# Patient Record
Sex: FEMALE | Race: BLACK OR AFRICAN AMERICAN | NOT HISPANIC OR LATINO | RURAL
[De-identification: names, ages, dates, MRNs, and addresses within clinical notes are randomized per-mention and may not be internally consistent; named-entity substitution may affect disease eponyms.]

---
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
## 2024-01-24 ENCOUNTER — OFFICE VISIT (OUTPATIENT)
Dept: FAMILY MEDICINE | Facility: CLINIC | Age: 19
End: 2024-01-24
Payer: MEDICAID

## 2024-01-24 VITALS
DIASTOLIC BLOOD PRESSURE: 105 MMHG | HEART RATE: 85 BPM | TEMPERATURE: 98 F | WEIGHT: 210 LBS | SYSTOLIC BLOOD PRESSURE: 139 MMHG | OXYGEN SATURATION: 100 % | BODY MASS INDEX: 33.75 KG/M2 | HEIGHT: 66 IN

## 2024-01-24 DIAGNOSIS — R80.9 PROTEINURIA, UNSPECIFIED TYPE: ICD-10-CM

## 2024-01-24 DIAGNOSIS — I10 PRIMARY HYPERTENSION: Primary | ICD-10-CM

## 2024-01-24 DIAGNOSIS — R03.0 ELEVATED BLOOD PRESSURE READING: ICD-10-CM

## 2024-01-24 DIAGNOSIS — Z76.89 ESTABLISHING CARE WITH NEW DOCTOR, ENCOUNTER FOR: ICD-10-CM

## 2024-01-24 LAB
B-HCG UR QL: NEGATIVE
BILIRUB SERPL-MCNC: ABNORMAL MG/DL
BLOOD URINE, POC: NEGATIVE
CLARITY, POC UA: ABNORMAL
COLOR, POC UA: YELLOW
CREAT UR-MCNC: 662 MG/DL (ref 28–219)
CTP QC/QA: YES
GLUCOSE UR QL STRIP: NEGATIVE
KETONES UR QL STRIP: ABNORMAL
LEUKOCYTE ESTERASE URINE, POC: NEGATIVE
MICROALBUMIN UR-MCNC: 8.8 MG/DL (ref 0–2.8)
MICROALBUMIN/CREAT RATIO PNL UR: 13.3 MG/G (ref 0–30)
NITRITE, POC UA: NEGATIVE
PH, POC UA: 6
PROTEIN, POC: 30
SPECIFIC GRAVITY, POC UA: 1.02
UROBILINOGEN, POC UA: 4

## 2024-01-24 PROCEDURE — 3080F DIAST BP >= 90 MM HG: CPT | Mod: CPTII,,, | Performed by: NURSE PRACTITIONER

## 2024-01-24 PROCEDURE — 99204 OFFICE O/P NEW MOD 45 MIN: CPT | Mod: ,,, | Performed by: NURSE PRACTITIONER

## 2024-01-24 PROCEDURE — 3008F BODY MASS INDEX DOCD: CPT | Mod: CPTII,,, | Performed by: NURSE PRACTITIONER

## 2024-01-24 PROCEDURE — 82043 UR ALBUMIN QUANTITATIVE: CPT | Mod: ,,, | Performed by: CLINICAL MEDICAL LABORATORY

## 2024-01-24 PROCEDURE — 81025 URINE PREGNANCY TEST: CPT | Mod: RHCUB | Performed by: NURSE PRACTITIONER

## 2024-01-24 PROCEDURE — 81002 URINALYSIS NONAUTO W/O SCOPE: CPT | Mod: RHCUB | Performed by: NURSE PRACTITIONER

## 2024-01-24 PROCEDURE — 3075F SYST BP GE 130 - 139MM HG: CPT | Mod: CPTII,,, | Performed by: NURSE PRACTITIONER

## 2024-01-24 PROCEDURE — 82570 ASSAY OF URINE CREATININE: CPT | Mod: ,,, | Performed by: CLINICAL MEDICAL LABORATORY

## 2024-01-24 PROCEDURE — 1159F MED LIST DOCD IN RCRD: CPT | Mod: CPTII,,, | Performed by: NURSE PRACTITIONER

## 2024-01-24 RX ORDER — HYDROCHLOROTHIAZIDE 12.5 MG/1
12.5 TABLET ORAL DAILY
Qty: 30 TABLET | Refills: 0 | Status: SHIPPED | OUTPATIENT
Start: 2024-01-24 | End: 2025-01-23

## 2024-01-24 RX ORDER — LOSARTAN POTASSIUM 50 MG/1
50 TABLET ORAL DAILY
Qty: 30 TABLET | Refills: 0 | Status: SHIPPED | OUTPATIENT
Start: 2024-01-24 | End: 2025-01-23

## 2024-01-24 NOTE — PROGRESS NOTES
[facility]    [address] [place] [address]     PATIENT NAME: Evelyn Phillip  : 2005  DATE: 24  MRN: [id number]      Billing Provider: [facility]  Level of Service: UT OFFICE/OUTPT VISIT, TAVON HENRIQUEZ IV, 45-59 MIN    ASSESSMENT AND PLAN:      Problem List Items Addressed This Visit          Cardiac/Vascular    Primary hypertension - Primary    Current Assessment & Plan     Chronic exacerbated problem  UA/Urine pregnancy-- negative/ Microalbumin  Begin losartan 50 mg daily and Hydrochlorothiazide 12.5 mg daily  Low salt diet  Monitor blood pressure and bring readings to next visit along with all medications  Return for recheck in one week         Relevant Medications    losartan (COZAAR) 50 MG tablet    hydroCHLOROthiazide (HYDRODIURIL) 12.5 MG Tab    Other Relevant Orders    POCT urine dipstick without microscope (Completed)    POCT urine pregnancy (Completed)    Microalbumin/Creatinine Ratio, Urine     Other Visit Diagnoses       Establishing care with new doctor, encounter for        Elevated blood pressure reading        Relevant Orders    POCT urine dipstick without microscope (Completed)    POCT urine pregnancy (Completed)    Microalbumin/Creatinine Ratio, Urine    Proteinuria, unspecified type        Relevant Medications    losartan (COZAAR) 50 MG tablet    hydroCHLOROthiazide (HYDRODIURIL) 12.5 MG Tab            The patient has no Health Maintenance topics of status Not Due  Future Appointments   Date Time Provider Department Center   2024  2:00 PM Alfreda Kamara FNP [facility]      Follow up in about 1 week (around 2024) for fasting lab and f/u. or sooner as needed.      CHIEF COMPLAINT: Hypertension (F/U on BP. Patient has a new job and when they checked BP at work BP was 186/118 and 186/114. Pt was on BP medication as a kid but does not recall name of medication. ) and Annual Exam            HISTORY OF PRESENT ILLNESS:  Evelyn Phillip is a 18 y.o. female who presents to the clinic today     Evelyn Phillip presents to the clinic with Hypertension (F/U on BP. Patient has a new job and when they checked BP at work BP was 186/118 and 186/114. Pt was on BP medication as a kid but does not recall name of medication. ) and Annual Exam     Home blood pressure documented: 186/118 and 186/114  Patient reports having BP many years but off medications  Seen by GYN 3/2023 with documented /111    Hypertension  This is a recurrent problem. The current episode started 1 to 4 weeks ago. The problem has been gradually worsening since onset. The problem is uncontrolled. Associated symptoms include malaise/fatigue. Risk factors for coronary artery disease include family history, obesity and stress. Treatments tried: unable to recall name of medication. Compliance problems include exercise and diet.        PAST MEDICAL HISTORY:      Past Medical History:   Diagnosis Date    Hypertension      PAST SURGICAL HISTORY:  History reviewed. No pertinent surgical history.  SOCIAL HISTORY:  Social History     Socioeconomic History    Marital status: Single   Tobacco Use    Smoking status: Never     Passive exposure: Never    Smokeless tobacco: Never   Substance and Sexual Activity    Alcohol use: Never    Drug use: Never    Sexual activity: Yes     Partners: Male     FAMILY HISTORY:       Family History   Problem Relation Age of Onset    Hypertension Mother        ALLERGIES AND MEDICATIONS: updated and reviewed.       Review of patient's allergies indicates:  No Known Allergies      SCREENING HISTORY:     Health Maintenance         Date Due Completion Date    Hepatitis C Screening Never done ---    Lipid Panel Never done ---    COVID-19 Vaccine (1) Never done ---    HPV Vaccines (1 - 2-dose series) Never done ---    HIV Screening Never done ---    Chlamydia Screening Never done ---    TETANUS VACCINE Never done ---       "      REVIEW OF SYSTEMS:   Review of Systems   Constitutional:  Positive for malaise/fatigue.         PHYSICAL EXAM:         BP (!) 139/105 (BP Location: Right arm, Patient Position: Sitting)   Pulse 85   Temp 98.3 °F (36.8 °C) (Oral)   Ht 5' 5.5" (1.664 m)   Wt 95.3 kg (210 lb)   LMP 01/18/2024 (Approximate)   SpO2 100%   BMI 34.41 kg/m²  Patient's last menstrual period was 01/18/2024 (approximate).  Wt Readings from Last 3 Encounters:   01/24/24 95.3 kg (210 lb) (98 %, Z= 2.08)*   03/07/23 96.6 kg (213 lb) (98 %, Z= 2.12)*     * Growth percentiles are based on CDC (Girls, 2-20 Years) data.     BP Readings from Last 3 Encounters:   01/24/24 (!) 139/105   03/07/23 (!) 160/110     Estimated body mass index is 34.41 kg/m² as calculated from the following:    Height as of this encounter: 5' 5.5" (1.664 m).    Weight as of this encounter: 95.3 kg (210 lb).   Vitals:    01/24/24 1303 01/24/24 1309   BP: (!) 151/117 (!) 139/105   BP Location: Left arm Right arm   Patient Position: Sitting Sitting   Pulse: 85 85   Temp: 98.3 °F (36.8 °C)    TempSrc: Oral    SpO2: 100%    Weight: 95.3 kg (210 lb)    Height: 5' 5.5" (1.664 m)         Physical Exam  Constitutional:       Appearance: She is obese.   Cardiovascular:      Rate and Rhythm: Normal rate and regular rhythm.      Pulses: Normal pulses.      Heart sounds: Normal heart sounds.   Pulmonary:      Effort: Pulmonary effort is normal.      Breath sounds: Normal breath sounds.   Abdominal:      Palpations: Abdomen is soft.   Musculoskeletal:      Cervical back: Neck supple.   Neurological:      Mental Status: She is alert and oriented to person, place, and time.   Psychiatric:         Mood and Affect: Mood normal.         LABS:     I have reviewed old labs below:  No results found for: "WBC", "HGB", "HCT", "MCV", "PLT", "NA", "K", "CL", "CALCIUM", "PHOS", "CO2", "GLU", "BUN", "CREATININE", "ANIONGAP", "ESTGFRAFRICA", "EGFRNONAA", "PROT", "ALBUMIN", "BILITOT", " ""ALKPHOS", "ALT", "AST", "INR", "CHOL", "TRIG", "HDL", "LDLCALC", "TSH", "PSA", "GLUF", "HGBA1C", "MICROALBUR"        Signature:  [facility]  [address] [place] [address]  Phone #: [phone]  Fax #: [phone]       Date of encounter: 1/24/24    Patient Instructions   UA/Urine pregnancy-- negative/ Microalbumin  Begin losartan 50 mg daily and Hydrochlorothiazide 12.5 mg daily  Low salt diet  Monitor blood pressure and bring readings to next visit along with all medications  Return for recheck in one week    Reduce sodium/salt in your diet  If you need to season your food, use peppers or powders or other food for seasoning  Take your medication as prescribed each day  Monitor and record your blood pressure reading and bring results to each office visit  Goal blood pressure is 120/80 or less but not less than 90/60  Bring your medications to each office visit for review  Loosing 10 lbs will lower your blood pressure to a healthier level  This healthy change will lower your risk for heart attack, stroke, heart disease, and/or death  Your heart is a muscle and needs to be exercised each day to work its best  Maintaining a healthy blood pressure also protects your kidneys  Flushing your kidneys by drinking water may help       "

## 2024-01-24 NOTE — ASSESSMENT & PLAN NOTE
Chronic exacerbated problem  UA/Urine pregnancy-- negative/ Microalbumin  Begin losartan 50 mg daily and Hydrochlorothiazide 12.5 mg daily  Low salt diet  Monitor blood pressure and bring readings to next visit along with all medications  Return for recheck in one week

## 2024-01-24 NOTE — LETTER
January 24, 2024    Evelyn Phillip  [address]  [place] [address]             [facility]  Family Medicine  [address]   [place] [address]  Phone: [phone]  Fax: [phone]   January 24, 2024     Patient: Evelyn Phillip   YOB: 2005   Date of Visit: 1/24/2024       To Whom it May Concern:    Evelyn Phillip was seen in my clinic on 1/24/2024. She may return with no restrictions. Miss Home will be under my care and will be re-evaluated in one week.    Please excuse her from any classes or work missed.    If you have any questions or concerns, please don't hesitate to call.    Sincerely,         Alfreda Kamara, FNP

## 2024-01-24 NOTE — PATIENT INSTRUCTIONS
UA/Urine pregnancy-- negative/ Microalbumin  Begin losartan 50 mg daily and Hydrochlorothiazide 12.5 mg daily  Low salt diet  Monitor blood pressure and bring readings to next visit along with all medications  Return for recheck in one week    Reduce sodium/salt in your diet  If you need to season your food, use peppers or powders or other food for seasoning  Take your medication as prescribed each day  Monitor and record your blood pressure reading and bring results to each office visit  Goal blood pressure is 120/80 or less but not less than 90/60  Bring your medications to each office visit for review  Loosing 10 lbs will lower your blood pressure to a healthier level  This healthy change will lower your risk for heart attack, stroke, heart disease, and/or death  Your heart is a muscle and needs to be exercised each day to work its best  Maintaining a healthy blood pressure also protects your kidneys  Flushing your kidneys by drinking water may help

## 2024-01-31 ENCOUNTER — OFFICE VISIT (OUTPATIENT)
Dept: FAMILY MEDICINE | Facility: CLINIC | Age: 19
End: 2024-01-31
Payer: MEDICAID

## 2024-01-31 VITALS
DIASTOLIC BLOOD PRESSURE: 85 MMHG | WEIGHT: 214.81 LBS | HEART RATE: 92 BPM | TEMPERATURE: 99 F | SYSTOLIC BLOOD PRESSURE: 142 MMHG | RESPIRATION RATE: 18 BRPM | HEIGHT: 66 IN | BODY MASS INDEX: 34.52 KG/M2 | OXYGEN SATURATION: 100 %

## 2024-01-31 DIAGNOSIS — I10 PRIMARY HYPERTENSION: Primary | ICD-10-CM

## 2024-01-31 DIAGNOSIS — Z72.0 TOBACCO USE: ICD-10-CM

## 2024-01-31 PROCEDURE — 99214 OFFICE O/P EST MOD 30 MIN: CPT | Mod: ,,, | Performed by: NURSE PRACTITIONER

## 2024-01-31 PROCEDURE — 3061F NEG MICROALBUMINURIA REV: CPT | Mod: CPTII,,, | Performed by: NURSE PRACTITIONER

## 2024-01-31 PROCEDURE — 3066F NEPHROPATHY DOC TX: CPT | Mod: CPTII,,, | Performed by: NURSE PRACTITIONER

## 2024-01-31 PROCEDURE — 3079F DIAST BP 80-89 MM HG: CPT | Mod: CPTII,,, | Performed by: NURSE PRACTITIONER

## 2024-01-31 PROCEDURE — 3075F SYST BP GE 130 - 139MM HG: CPT | Mod: CPTII,,, | Performed by: NURSE PRACTITIONER

## 2024-01-31 PROCEDURE — 1159F MED LIST DOCD IN RCRD: CPT | Mod: CPTII,,, | Performed by: NURSE PRACTITIONER

## 2024-01-31 PROCEDURE — 4010F ACE/ARB THERAPY RXD/TAKEN: CPT | Mod: CPTII,,, | Performed by: NURSE PRACTITIONER

## 2024-01-31 PROCEDURE — 1160F RVW MEDS BY RX/DR IN RCRD: CPT | Mod: CPTII,,, | Performed by: NURSE PRACTITIONER

## 2024-01-31 PROCEDURE — 3008F BODY MASS INDEX DOCD: CPT | Mod: CPTII,,, | Performed by: NURSE PRACTITIONER

## 2024-01-31 NOTE — PATIENT INSTRUCTIONS
"Follow up in 2 weeks with pcp. Continue current meds. Quit smoking! Recommend diet and exercise. Recommend decrease and try to eliminate "white foods" such as potatoes, sweets, breads. Recommend decrease and try eliminate soda drinks and drink 1/2 of body weight in ounces of water per day.    "

## 2024-01-31 NOTE — PROGRESS NOTES
Amber Cohen DNP, [facility]    [address] [place] [address]     PATIENT NAME: Evelyn Phillip  : 2005  DATE: 24  MRN: [id number]      Billing Provider: Amber Cohen DNP, FNP  Level of Service:   Patient PCP Information       Provider PCP Type    Primary Doctor No General            Reason for Visit / Chief Complaint: Follow-up (Started on BP medication last week. States she has been taking medications daily. Does not have BP machine to check at home.)       Update PCP  Update Chief Complaint         History of Present Illness / Problem Focused Workflow     Evelyn Phillip presents to the clinic with Follow-up (Started on BP medication last week. States she has been taking medications daily. Does not have BP machine to check at home.)   Pt tolerating meds without any side effects. Pt does smoke.     Follow-up  Pertinent negatives include no abdominal pain, arthralgias, change in bowel habit, chest pain, chills, congestion, coughing, fatigue, fever, headaches, myalgias, nausea, rash, sore throat, vomiting or weakness.       Review of Systems     Review of Systems   Constitutional:  Negative for activity change, appetite change, chills, fatigue and fever.   HENT:  Negative for nasal congestion, ear pain, hearing loss, postnasal drip and sore throat.    Respiratory:  Negative for cough, chest tightness, shortness of breath and wheezing.    Cardiovascular:  Negative for chest pain, palpitations, leg swelling and claudication.   Gastrointestinal:  Negative for abdominal pain, change in bowel habit, constipation, diarrhea, nausea and vomiting.   Genitourinary:  Negative for dysuria.   Musculoskeletal:  Negative for arthralgias, back pain, gait problem and myalgias.   Integumentary:  Negative for rash.   Neurological:  Negative for weakness and headaches.   Psychiatric/Behavioral:  Negative for suicidal ideas. The patient is not nervous/anxious.         Medical /  "Social / Family History     Past Medical History:   Diagnosis Date    Hypertension        History reviewed. No pertinent surgical history.    Social History  Ms. Evelyn Phillip  reports that she has been smoking cigars. She started smoking about 12 months ago. She has never been exposed to tobacco smoke. She has never used smokeless tobacco. She reports that she does not drink alcohol and does not use drugs.    Family History  Ms. Evelyn Phillip's family history includes Hypertension in her mother.    Medications and Allergies     Medications  Outpatient Medications Marked as Taking for the 1/31/24 encounter (Office Visit) with Amber Cohen, SEJAL, FNP   Medication Sig Dispense Refill    hydroCHLOROthiazide (HYDRODIURIL) 12.5 MG Tab Take 1 tablet (12.5 mg total) by mouth once daily. 30 tablet 0    losartan (COZAAR) 50 MG tablet Take 1 tablet (50 mg total) by mouth once daily. 30 tablet 0       Allergies  Review of patient's allergies indicates:  No Known Allergies    Physical Examination     Vitals:    01/31/24 1025 01/31/24 1027 01/31/24 1045   BP: (!) 141/90 (!) 134/90 (!) 142/85   BP Location: Right arm Left arm Right arm   Patient Position: Sitting Sitting Sitting   BP Method: Large (Automatic) Large (Automatic) Large (Automatic)   Pulse: 92     Resp: 18     Temp: 98.7 °F (37.1 °C)     TempSrc: Oral     SpO2: 100%     Weight: 97.4 kg (214 lb 12.8 oz)     Height: 5' 5.5" (1.664 m)       Physical Exam  Vitals and nursing note reviewed.   Constitutional:       General: She is not in acute distress.     Appearance: Normal appearance. She is obese. She is not ill-appearing.   Eyes:      Extraocular Movements: Extraocular movements intact.      Pupils: Pupils are equal, round, and reactive to light.   Cardiovascular:      Rate and Rhythm: Normal rate and regular rhythm.      Heart sounds: Normal heart sounds.   Pulmonary:      Effort: Pulmonary effort is normal.      Breath sounds: Normal breath sounds.   Abdominal: " "     General: Bowel sounds are normal.      Palpations: Abdomen is soft.   Musculoskeletal:         General: Normal range of motion.   Skin:     Findings: No rash.   Neurological:      General: No focal deficit present.      Mental Status: She is alert and oriented to person, place, and time. Mental status is at baseline.   Psychiatric:         Mood and Affect: Mood normal.         Behavior: Behavior normal.          Assessment and Plan (including Health Maintenance)      Problem List  Smart Sets  Document Outside HM   :    Plan:     May need labs at next visit for kidney function and electrolytes due to diuretic therapy and hypertension.     Health Maintenance Due   Topic Date Due    Hepatitis C Screening  Never done    Lipid Panel  Never done    COVID-19 Vaccine (1) Never done    HPV Vaccines (1 - 2-dose series) Never done    HIV Screening  Never done    Chlamydia Screening  Never done    TETANUS VACCINE  Never done       Problem List Items Addressed This Visit          Cardiac/Vascular    Primary hypertension - Primary     Other Visit Diagnoses       BMI 35.0-35.9,adult        Tobacco use              Primary hypertension    BMI 35.0-35.9,adult    Tobacco use       The patient has no Health Maintenance topics of status Not Due        No future appointments.     Follow up in about 2 weeks (around 2/14/2024).     Signature:  Amber Cohen DNP, FNP  [address] [place] [address]  Phone #: [phone]  Fax #: [phone]    Date of encounter: 1/31/24    Patient Instructions   Follow up in 2 weeks with pcp. Continue current meds. Quit smoking! Recommend diet and exercise. Recommend decrease and try to eliminate "white foods" such as potatoes, sweets, breads. Recommend decrease and try eliminate soda drinks and drink 1/2 of body weight in ounces of water per day.       "